# Patient Record
Sex: MALE | Race: WHITE | NOT HISPANIC OR LATINO | Employment: FULL TIME | URBAN - METROPOLITAN AREA
[De-identification: names, ages, dates, MRNs, and addresses within clinical notes are randomized per-mention and may not be internally consistent; named-entity substitution may affect disease eponyms.]

---

## 2017-03-06 ENCOUNTER — OFFICE VISIT (OUTPATIENT)
Dept: FAMILY MEDICINE | Facility: CLINIC | Age: 22
End: 2017-03-06
Payer: COMMERCIAL

## 2017-03-06 VITALS
WEIGHT: 188 LBS | HEIGHT: 70 IN | DIASTOLIC BLOOD PRESSURE: 70 MMHG | TEMPERATURE: 98.4 F | SYSTOLIC BLOOD PRESSURE: 100 MMHG | BODY MASS INDEX: 26.92 KG/M2 | HEART RATE: 64 BPM | OXYGEN SATURATION: 97 %

## 2017-03-06 DIAGNOSIS — H10.021 PINK EYE DISEASE OF RIGHT EYE: Primary | ICD-10-CM

## 2017-03-06 PROCEDURE — 99213 OFFICE O/P EST LOW 20 MIN: CPT | Performed by: FAMILY MEDICINE

## 2017-03-06 RX ORDER — MOMETASONE FUROATE MONOHYDRATE 50 UG/1
1-2 SPRAY, METERED NASAL DAILY PRN
COMMUNITY
Start: 2017-02-20 | End: 2017-06-19

## 2017-03-06 RX ORDER — GENTAMICIN SULFATE 3 MG/ML
1 SOLUTION/ DROPS OPHTHALMIC EVERY 4 HOURS
Qty: 5 ML | Refills: 0 | Status: SHIPPED | OUTPATIENT
Start: 2017-03-06 | End: 2017-03-13

## 2017-03-06 NOTE — MR AVS SNAPSHOT
After Visit Summary   3/6/2017    Reno Tran    MRN: [id number]           Patient Information     Date Of Birth          1995        Visit Information        Provider Department      3/6/2017 9:40 AM Kevan Marie MD [facility] [place]        Today's Diagnoses     Pink eye disease of right eye    -  1      Care Instructions      Conjunctivitis, Viral    Viral conjunctivitis (sometimes called pink eye) is a common infection of the eye. It is very contagious. Touching the infected eye, then touching another person passes this infection. It can also be spread from one eye to the other in this same way. The most common symptoms include redness, discharge from the eye, swollen eyelids, and a gritty or scratchy feeling in the eye.  This condition will take about 7 to 10 days to go away. Artificial tears (available without a prescription) are often recommended to moisten and clean the eyes. Antibiotic eye drops often are not recommended because they will not kill the virus. But sometimes they may be prescribed by eye doctors. This is to prevent a second, bacterial infection.  Home care    Apply a towel soaked in cool water to the affected eye 3 to 4 times a day (just before applying medicine to the eye).    It is common to have mucus drainage during the night, causing the eyelids to become crusted by morning. Use a warm, wet cloth to wipe this away.    Launder cloths used to clean the eye after one use. Do not reuse them.    If antibiotic medicines are prescribed, take them exactly as directed. Do not stop taking them until you are told to.    You may use acetaminophen or ibuprofen to control pain, unless another medicine was prescribed. (Note:If you have chronic liver or kidney disease, or if you have ever had a stomach ulcer or gastrointestinal bleeding, talk with your healthcare provider before using these medicines.) Aspirin should never be used in anyone under 18 years of age who is  ill with a fever. It may cause severe liver damage.    Wash your hands before and after touching the affected eye. This helps to prevent spreading the infection to your other eye and to others.    The infected person should avoid sharing towels, washcloths, and bedding with others. This is to prevent spreading the infection.    This illness is contagious during the first week. Children with this illness should be kept out of day care and school until the redness clears.  Follow-up care  Follow up with your healthcare provider, or as advised.  When to seek medical advice  Call your healthcare provider right away if any of these occur:    Worsening vision    Increasing pain in the eye    Increasing swelling or redness of the eyelid    Redness spreading to the face around the eye    Large amount of green or yellow drainage from the eye    Severe itching in or around the eye    Fever of 100.4 F (38 C) or higher    4149-7383 [employer]. [address] [place] [address]. All rights reserved. This information is not intended as a substitute for professional medical care. Always follow your healthcare professional's instructions.              Follow-ups after your visit        Follow-up notes from your care team     Return in about 1 week (around 3/13/2017), or if symptoms worsen or fail to improve.      Who to contact     If you have questions or need follow up information about today's clinic visit or your schedule please contact [facility] directly at [phone].  Normal or non-critical lab and imaging results will be communicated to you by MyChart, letter or phone within 4 business days after the clinic has received the results. If you do not hear from us within 7 days, please contact the clinic through If[employer]phone[employer] or phone. If you have a critical or abnormal lab result, we will notify you by phone as soon as possible.  Submit refill requests through [employer] or call your pharmacy  "and they will forward the refill request to us. Please allow 3 business days for your refill to be completed.          Additional Information About Your Visit        Edi[employer]iohart Information     [employer] gives you secure access to your electronic health record. If you see a primary care provider, you can also send messages to your care team and make appointments. If you have questions, please call your primary care clinic.  If you do not have a primary care provider, please call [phone] and they will assist you.        Care EveryWhere ID     This is your Care EveryWhere ID. This could be used by other organizations to access your [place] medical records  [id number]        Your Vitals Were     Pulse Temperature Height Pulse Oximetry BMI (Body Mass Index)       64 98.4  F (36.9  C) (Oral) 5' 10\" (1.778 m) 97% 26.98 kg/m2        Blood Pressure from Last 3 Encounters:   03/06/17 100/70   06/20/16 124/70   12/14/15 118/74    Weight from Last 3 Encounters:   03/06/17 188 lb (85.3 kg)   06/20/16 190 lb (86.2 kg)   12/14/15 190 lb (86.2 kg)              Today, you had the following     No orders found for display         Today's Medication Changes          These changes are accurate as of: 3/6/17 10:06 AM.  If you have any questions, ask your nurse or doctor.               Start taking these medicines.        Dose/Directions    gentamicin 0.3 % ophthalmic solution   Commonly known as:  GARAMYCIN   Used for:  Pink eye disease of right eye   Started by:  Kevan Marie MD        Dose:  1 drop   Place 1 drop into both eyes every 4 hours for 7 days   Quantity:  5 mL   Refills:  0         Stop taking these medicines if you haven't already. Please contact your care team if you have questions.     FLONASE 50 MCG/ACT spray   Generic drug:  fluticasone   Stopped by:  Kevan Marie MD                Where to get your medicines      Some of these will need a paper prescription and others can be bought over the counter.  Ask " your nurse if you have questions.     Bring a paper prescription for each of these medications     gentamicin 0.3 % ophthalmic solution                Primary Care Provider Office Phone # Fax #    Kevan Marie [phone]       [facility] [address] [place] [address]        Thank you!     Thank you for choosing [facility]  for your care. Our goal is always to provide you with excellent care. Hearing back from our patients is one way we can continue to improve our services. Please take a few minutes to complete the written survey that you may receive in the mail after your visit with us. Thank you!             Your Updated Medication List - Protect others around you: Learn how to safely use, store and throw away your medicines at www.disposemymeds.org.          This list is accurate as of: 3/6/17 10:06 AM.  Always use your most recent med list.                   Brand Name Dispense Instructions for use    gentamicin 0.3 % ophthalmic solution    GARAMYCIN    5 mL    Place 1 drop into both eyes every 4 hours for 7 days       mometasone 50 MCG/ACT spray    NASONEX     Spray 1-2 sprays in nostril daily as needed

## 2017-03-06 NOTE — PROGRESS NOTES
"  SUBJECTIVE:                                                    Reno Tran is a 21 year old male who presents to clinic today for the following health issues:    Eye(s) Problem     Onset: 1 day exposed to pink eye over the weekend    Description:   Location: right  Pain: no  Redness: YES    Accompanying Signs & Symptoms:  Discharge/mattering: YES  Swelling: YES  Visual changes: no  Fever: no  Nasal Congestion: no  Bothered by bright lights: no  Trauma: no     History:   Trauma: no   Foreign body exposure: no    Precipitating factors:   Wearing contacts: no    Alleviating factors:  Improved by: warm cloth- eye drops       Therapies Tried and outcome: eye drops      Problem list and histories reviewed & adjusted, as indicated.  Additional history: as documented    ROS:  Constitutional, HEENT, cardiovascular, pulmonary, GI, , musculoskeletal, neuro, skin, endocrine and psych systems are negative, except as otherwise noted.    This document serves as a record of the services and decisions personally performed and made by Kevan Marie MD. It was created on his behalf by Nella Sewell, a trained medical scribe. The creation of this document is based the provider's statements to the medical scribe.  Nella Sewell   OBJECTIVE:                                                    /70 (BP Location: Left arm, Patient Position: Chair, Cuff Size: Adult Large)  Pulse 64  Temp 98.4  F (36.9  C) (Oral)  Ht 1.778 m (5' 10\")  Wt 85.3 kg (188 lb)  SpO2 97%  BMI 26.98 kg/m2 Body mass index is 26.98 kg/(m^2).   GENERAL: healthy, alert, well nourished, well hydrated, no distress  EYES: mild injection of the conjunctiva - right eye, otherwise, Eyes grossly normal to inspection, extraocular movements - intact, and PERRL  HENT: ear canals- normal; TMs- normal; Nose- normal; Mouth- no ulcers, no lesions  NECK: no tenderness, no adenopathy, no asymmetry, no masses, no stiffness; thyroid- normal to palpation  PSYCH: Alert and " "oriented times 3; speech- coherent , normal rate and volume; able to articulate logical thoughts, able to abstract reason, no tangential thoughts, no hallucinations or delusions, affect- normal  Diagnostic test results: none     ASSESSMENT/PLAN:         Rneo was seen today for eye problem.    Diagnoses and all orders for this visit:    Pink eye disease of right eye: Advised to wash hands and avoid touching eyes.   -     gentamicin (GARAMYCIN) 0.3 % ophthalmic solution; Place 1 drop into both eyes every 4 hours for 7 days    Risks, benefits and alternatives of treatments discussed. Plan agreed on.      Followup: prm     Will call, return to clinic, or go to ED if worsening or symptoms not improving as discussed.    See patient instructions.     BMI:   Estimated body mass index is 26.98 kg/(m^2) as calculated from the following:    Height as of this encounter: 1.778 m (5' 10\").    Weight as of this encounter: 85.3 kg (188 lb).   Weight management plan: Discussed healthy diet and exercise guidelines and patient will follow up in 6 months in clinic to re-evaluate.      Health Maintenance Topics with due status: Overdue       Topic Date Due    HPV IMMUNIZATION 06/12/2004       Health maintenance reviewed/updated? Yes    The information in this document, created by the medical scribe for me, accurately reflects the services I personally performed and the decisions made by me. I have reviewed and approved this document for accuracy prior to leaving the patient care area.  Kevan Marie MD March 6, 2017 9:53 AM        Sy Marie MD   "

## 2017-03-06 NOTE — NURSING NOTE
"Chief Complaint   Patient presents with     Eye Problem       Initial /70  Pulse 64  Temp 98.4  F (36.9  C) (Oral)  Ht 5' 10\" (1.778 m)  Wt 188 lb (85.3 kg)  SpO2 97%  BMI 26.98 kg/m2 Estimated body mass index is 26.98 kg/(m^2) as calculated from the following:    Height as of this encounter: 5' 10\" (1.778 m).    Weight as of this encounter: 188 lb (85.3 kg).  Medication Reconciliation: complete  "

## 2017-03-06 NOTE — PATIENT INSTRUCTIONS
Conjunctivitis, Viral    Viral conjunctivitis (sometimes called pink eye) is a common infection of the eye. It is very contagious. Touching the infected eye, then touching another person passes this infection. It can also be spread from one eye to the other in this same way. The most common symptoms include redness, discharge from the eye, swollen eyelids, and a gritty or scratchy feeling in the eye.  This condition will take about 7 to 10 days to go away. Artificial tears (available without a prescription) are often recommended to moisten and clean the eyes. Antibiotic eye drops often are not recommended because they will not kill the virus. But sometimes they may be prescribed by eye doctors. This is to prevent a second, bacterial infection.  Home care    Apply a towel soaked in cool water to the affected eye 3 to 4 times a day (just before applying medicine to the eye).    It is common to have mucus drainage during the night, causing the eyelids to become crusted by morning. Use a warm, wet cloth to wipe this away.    Launder cloths used to clean the eye after one use. Do not reuse them.    If antibiotic medicines are prescribed, take them exactly as directed. Do not stop taking them until you are told to.    You may use acetaminophen or ibuprofen to control pain, unless another medicine was prescribed. (Note:If you have chronic liver or kidney disease, or if you have ever had a stomach ulcer or gastrointestinal bleeding, talk with your healthcare provider before using these medicines.) Aspirin should never be used in anyone under 18 years of age who is ill with a fever. It may cause severe liver damage.    Wash your hands before and after touching the affected eye. This helps to prevent spreading the infection to your other eye and to others.    The infected person should avoid sharing towels, washcloths, and bedding with others. This is to prevent spreading the infection.    This illness is contagious during  the first week. Children with this illness should be kept out of day care and school until the redness clears.  Follow-up care  Follow up with your healthcare provider, or as advised.  When to seek medical advice  Call your healthcare provider right away if any of these occur:    Worsening vision    Increasing pain in the eye    Increasing swelling or redness of the eyelid    Redness spreading to the face around the eye    Large amount of green or yellow drainage from the eye    Severe itching in or around the eye    Fever of 100.4 F (38 C) or higher    2099-0621 [employer]. [address] [place] [address]. All rights reserved. This information is not intended as a substitute for professional medical care. Always follow your healthcare professional's instructions.

## 2017-04-03 ENCOUNTER — RADIANT APPOINTMENT (OUTPATIENT)
Dept: GENERAL RADIOLOGY | Facility: CLINIC | Age: 22
End: 2017-04-03
Attending: FAMILY MEDICINE
Payer: COMMERCIAL

## 2017-04-03 ENCOUNTER — OFFICE VISIT (OUTPATIENT)
Dept: FAMILY MEDICINE | Facility: CLINIC | Age: 22
End: 2017-04-03
Payer: COMMERCIAL

## 2017-04-03 VITALS
OXYGEN SATURATION: 97 % | DIASTOLIC BLOOD PRESSURE: 60 MMHG | SYSTOLIC BLOOD PRESSURE: 114 MMHG | BODY MASS INDEX: 26.04 KG/M2 | WEIGHT: 186 LBS | HEIGHT: 71 IN | HEART RATE: 62 BPM | RESPIRATION RATE: 12 BRPM | TEMPERATURE: 98.1 F

## 2017-04-03 DIAGNOSIS — S09.92XA: ICD-10-CM

## 2017-04-03 DIAGNOSIS — S02.2XXA CLOSED FRACTURE OF NASAL BONE, INITIAL ENCOUNTER: Primary | ICD-10-CM

## 2017-04-03 PROCEDURE — 99213 OFFICE O/P EST LOW 20 MIN: CPT | Performed by: FAMILY MEDICINE

## 2017-04-03 PROCEDURE — 70160 X-RAY EXAM OF NASAL BONES: CPT

## 2017-04-03 NOTE — MR AVS SNAPSHOT
After Visit Summary   4/3/2017    Reno Tran    MRN: [id number]           Patient Information     Date Of Birth          1995        Visit Information        Provider Department      4/3/2017 2:40 PM Kevan Marie MD [facility]        Today's Diagnoses     Closed fracture of nasal bone, initial encounter    -  1    Injury to nose           Follow-ups after your visit        Additional Services     OTOLARYNGOLOGY REFERRAL       Your provider has referred you to: [facility]: [facility] [phone]   [email or web address]    Please be aware that coverage of these services is subject to the terms and limitations of your health insurance plan.  Call member services at your health plan with any benefit or coverage questions.      Please bring the following with you to your appointment:    (1) Any X-Rays, CTs or MRIs which have been performed.  Contact the facility where they were done to arrange for  prior to your scheduled appointment.   (2) List of current medications  (3) This referral request   (4) Any documents/labs given to you for this referral                  Who to contact     If you have questions or need follow up information about today's clinic visit or your schedule please contact [facility] directly at [phone].  Normal or non-critical lab and imaging results will be communicated to you by MyChart, letter or phone within 4 business days after the clinic has received the results. If you do not hear from us within 7 days, please contact the clinic through MyChart or phone. If you have a critical or abnormal lab result, we will notify you by phone as soon as possible.  Submit refill requests through [employer] or call your pharmacy and they will forward the refill request to us. Please allow 3 business days for your refill to be completed.          Additional Information About Your Visit        MyChart  "Information     [employer] gives you secure access to your electronic health record. If you see a primary care provider, you can also send messages to your care team and make appointments. If you have questions, please call your primary care clinic.  If you do not have a primary care provider, please call [phone] and they will assist you.        Care EveryWhere ID     This is your Care EveryWhere ID. This could be used by other organizations to access your [place] medical records  [id number]        Your Vitals Were     Pulse Temperature Respirations Height Pulse Oximetry BMI (Body Mass Index)    62 98.1  F (36.7  C) (Tympanic) 12 5' 11\" (1.803 m) 97% 25.94 kg/m2       Blood Pressure from Last 3 Encounters:   04/03/17 114/60   03/06/17 100/70   06/20/16 124/70    Weight from Last 3 Encounters:   04/03/17 186 lb (84.4 kg)   03/06/17 188 lb (85.3 kg)   06/20/16 190 lb (86.2 kg)              We Performed the Following     OTOLARYNGOLOGY REFERRAL        Primary Care Provider Office Phone # Fax #    Kevan Marie [phone]       [address] [address]        Thank you!     Thank you for choosing [facility]  for your care. Our goal is always to provide you with excellent care. Hearing back from our patients is one way we can continue to improve our services. Please take a few minutes to complete the written survey that you may receive in the mail after your visit with us. Thank you!             Your Updated Medication List - Protect others around you: Learn how to safely use, store and throw away your medicines at www.disposemymeds.org.          This list is accurate as of: 4/3/17  3:36 PM.  Always use your most recent med list.                   Brand Name Dispense Instructions for use    mometasone 50 MCG/ACT spray    NASONEX     Spray 1-2 sprays in nostril daily as needed         "

## 2017-04-03 NOTE — PROGRESS NOTES
"  SUBJECTIVE:                                                    Reno Tran is a 21 year old male who presents to clinic today for the following health issues:    Nose injury today playing basketball 4 hours ago.  Now complains of headache and feels the nose is swelling  Vision: normal  Tooth pain: none    Problem list and histories reviewed & adjusted, as indicated.  Additional history: as documented    ROS:  Constitutional, HEENT, cardiovascular, pulmonary, GI, , musculoskeletal, neuro, skin, endocrine and psych systems are negative, except as otherwise noted.    This document serves as a record of the services and decisions personally performed and made by Kevan Marie MD. It was created on his behalf by Nella Sewell, a trained medical scribe. The creation of this document is based the provider's statements to the medical scribe.  Nella Sewell   OBJECTIVE:                                                    /60  Pulse 62  Temp 98.1  F (36.7  C) (Tympanic)  Resp 12  Ht 1.803 m (5' 11\")  Wt 84.4 kg (186 lb)  SpO2 97%  BMI 25.94 kg/m2 Body mass index is 25.94 kg/(m^2).   GENERAL: healthy, alert, well nourished, well hydrated, no distress  EYES: Eyes grossly normal to inspection, extraocular movements - intact  MS: edema of nose, blood in nose, slight deviation of septum to the right, otherwise, extremities- no gross deformities noted, no edema  SKIN: no suspicious lesions, no rashes  PSYCH: Alert and oriented times 3; speech- coherent , normal rate and volume; able to articulate logical thoughts, able to abstract reason, no tangential thoughts, no hallucinations or delusions, affect- normal  Diagnostic test results:  Nose Xray - minimally displaced fracture of the nasal bone     ASSESSMENT/PLAN:         Reno was seen today for facial injury.    Diagnoses and all orders for this visit:    Closed fracture of nasal bone, initial encounter  Injury to nose: Advised to sleep propped " up/ice/acetominophen/drink fluids to help alleviate symptoms.   -     XR Nasal Bones 3 Views; Future  -     OTOLARYNGOLOGY REFERRAL    Risks, benefits and alternatives of treatments discussed. Plan agreed on.      Followup: Follow up with otolaryngology    Will call, return to clinic, or go to ED if worsening or symptoms not improving as discussed.    See patient instructions.       Health Maintenance Topics with due status: Overdue       Topic Date Due    HPV IMMUNIZATION 06/12/2004       Health maintenance reviewed/updated? Yes    The information in this document, created by the medical scribe for me, accurately reflects the services I personally performed and the decisions made by me. I have reviewed and approved this document for accuracy prior to leaving the patient care area.  Kevan Marie MD April 3, 2017 3:25 PM        Sy Marie MD

## 2017-04-03 NOTE — LETTER
[facility]  [address] [place] [address]                  [phone]   April 5, 2017    Reno Tran  [address] [address]      Dear Reno,    Here is a summary of your recent test results:    I believe there is a nondisplaced nasal bone fracture - referred to ENT    Your test results are enclosed.      Please contact me if you have any questions.    In addition, here is a list of due or overdue Health Maintenance reminders.    Health Maintenance Due   Topic Date Due     HPV IMMUNIZATION (1 of 3 - Male 3 Dose Series) 06/12/2004       Please call us at [phone] (or use [employer]) to address the above recommendations.            Thank you very much for trusting [facility]..     Healthy regards,          Sy Marie M.D.        Results for orders placed or performed in visit on 04/03/17   XR Nasal Bones 3 Views    Narrative    NASAL BONES THREE VIEWS  4/3/2017  3:16 PM     HISTORY: Unspecified injury of nose, initial encounter.       Impression    IMPRESSION: Three views of the nasal bone are performed. Oblique views  show no evidence of fracture. Mackay' view demonstrates a midline  septum. No air-fluid levels are noted in the maxillary sinuses.  Paranasal sinuses and mastoid air cells appear clear where visualized.    VALENTÍN ORELLANA MD

## 2017-04-05 ENCOUNTER — TRANSFERRED RECORDS (OUTPATIENT)
Dept: HEALTH INFORMATION MANAGEMENT | Facility: CLINIC | Age: 22
End: 2017-04-05

## 2017-06-19 ENCOUNTER — OFFICE VISIT (OUTPATIENT)
Dept: FAMILY MEDICINE | Facility: CLINIC | Age: 22
End: 2017-06-19
Payer: COMMERCIAL

## 2017-06-19 ENCOUNTER — RADIANT APPOINTMENT (OUTPATIENT)
Dept: GENERAL RADIOLOGY | Facility: CLINIC | Age: 22
End: 2017-06-19
Attending: FAMILY MEDICINE
Payer: COMMERCIAL

## 2017-06-19 VITALS
DIASTOLIC BLOOD PRESSURE: 68 MMHG | TEMPERATURE: 98.5 F | BODY MASS INDEX: 26.04 KG/M2 | HEIGHT: 71 IN | WEIGHT: 186 LBS | SYSTOLIC BLOOD PRESSURE: 120 MMHG | OXYGEN SATURATION: 98 % | HEART RATE: 78 BPM

## 2017-06-19 DIAGNOSIS — M79.672 LEFT FOOT PAIN: ICD-10-CM

## 2017-06-19 DIAGNOSIS — R09.81 CHRONIC NASAL CONGESTION: ICD-10-CM

## 2017-06-19 DIAGNOSIS — S99.929A FOOT INJURY: Primary | ICD-10-CM

## 2017-06-19 DIAGNOSIS — S99.929A FOOT INJURY: ICD-10-CM

## 2017-06-19 DIAGNOSIS — J30.1 SEASONAL ALLERGIC RHINITIS DUE TO POLLEN: Primary | ICD-10-CM

## 2017-06-19 PROCEDURE — 99213 OFFICE O/P EST LOW 20 MIN: CPT | Performed by: FAMILY MEDICINE

## 2017-06-19 PROCEDURE — 73630 X-RAY EXAM OF FOOT: CPT | Mod: LT

## 2017-06-19 RX ORDER — MOMETASONE FUROATE MONOHYDRATE 50 UG/1
1-2 SPRAY, METERED NASAL DAILY PRN
Qty: 17 G | Refills: 11 | Status: SHIPPED | OUTPATIENT
Start: 2017-06-19

## 2017-06-19 RX ORDER — NAPROXEN SODIUM 220 MG
220 TABLET ORAL 2 TIMES DAILY WITH MEALS
Qty: 180 TABLET | Refills: 3 | COMMUNITY
Start: 2017-06-19 | End: 2017-06-24

## 2017-06-19 NOTE — MR AVS SNAPSHOT
"              After Visit Summary   6/19/2017    Reno Tran    MRN: [id number]           Patient Information     Date Of Birth          1995        Visit Information        Provider Department      6/19/2017 1:40 PM Kevan Marie MD [facility]        Today's Diagnoses     Seasonal allergic rhinitis due to pollen    -  1    Left foot pain        Chronic nasal congestion           Follow-ups after your visit        Who to contact     If you have questions or need follow up information about today's clinic visit or your schedule please contact [facility] directly at [phone].  Normal or non-critical lab and imaging results will be communicated to you by [employer], letter or phone within 4 business days after the clinic has received the results. If you do not hear from us within 7 days, please contact the clinic through [employer] or phone. If you have a critical or abnormal lab result, we will notify you by phone as soon as possible.  Submit refill requests through [employer] or call your pharmacy and they will forward the refill request to us. Please allow 3 business days for your refill to be completed.          Additional Information About Your Visit        MyChart Information     [employer] gives you secure access to your electronic health record. If you see a primary care provider, you can also send messages to your care team and make appointments. If you have questions, please call your primary care clinic.  If you do not have a primary care provider, please call [phone] and they will assist you.        Care EveryWhere ID     This is your Care EveryWhere ID. This could be used by other organizations to access your [place] medical records  [id number]        Your Vitals Were     Pulse Temperature Height Pulse Oximetry BMI (Body Mass Index)       78 98.5  F (36.9  C) (Oral) 5' 11\" (1.803 m) 98% 25.94 kg/m2        Blood Pressure from Last 3 Encounters:   06/19/17 120/68 "   04/03/17 114/60   03/06/17 100/70    Weight from Last 3 Encounters:   06/19/17 186 lb (84.4 kg)   04/03/17 186 lb (84.4 kg)   03/06/17 188 lb (85.3 kg)              Today, you had the following     No orders found for display         Today's Medication Changes          These changes are accurate as of: 6/19/17  2:24 PM.  If you have any questions, ask your nurse or doctor.               Start taking these medicines.        Dose/Directions    naproxen sodium 220 MG tablet   Commonly known as:  ALEVE   Used for:  Left foot pain   Started by:  Kevan Marie MD        Dose:  220 mg   Take 1 tablet (220 mg) by mouth 2 times daily (with meals) for 5 days   Quantity:  180 tablet   Refills:  3            Where to get your medicines      These medications were sent to [facility] [place] [address]     Phone:  [phone]     mometasone 50 MCG/ACT spray         Some of these will need a paper prescription and others can be bought over the counter.  Ask your nurse if you have questions.     You don't need a prescription for these medications     naproxen sodium 220 MG tablet                Primary Care Provider Office Phone # Fax #    Kevan Marie [phone]       [address] [address]        Thank you!     Thank you for choosing [facility]  for your care. Our goal is always to provide you with excellent care. Hearing back from our patients is one way we can continue to improve our services. Please take a few minutes to complete the written survey that you may receive in the mail after your visit with us. Thank you!             Your Updated Medication List - Protect others around you: Learn how to safely use, store and throw away your medicines at www.disposemymeds.org.          This list is accurate as of: 6/19/17  2:24 PM.  Always use your  most recent med list.                   Brand Name Dispense Instructions for use    mometasone 50 MCG/ACT spray    NASONEX    17 g    Spray 1-2 sprays in nostril daily as needed       naproxen sodium 220 MG tablet    ALEVE    180 tablet    Take 1 tablet (220 mg) by mouth 2 times daily (with meals) for 5 days

## 2017-06-19 NOTE — PROGRESS NOTES
"  SUBJECTIVE:                                                    Reno Tran is a 22 year old male who presents to clinic today for the following health issues:    Foot Pain     Onset: noticed 5 days    Description:   Location: lateral side of left foot  Character: Sharp and Dull ache    Intensity: moderate, severe    Progression of Symptoms: worse when on foot long    Accompanying Signs & Symptoms:  Other symptoms: none  Erythema/swelling: Yes, a little initially  Knee pain: Yes, left medial knee was sore when playing frisbee  Heel pain: no   History:   Previous similar pain: no     Bad ankle sprains: no    Precipitating factors:   Trauma or overuse: YES- pt is a runner and his running shoes are bad- patient is very active  Hurt while he was playing golf   Has high arches    Alleviating factors:  Improved by: ice and rest       Therapies Tried and outcome: nothing    Allergies: Symptoms are tolerable. He does not take an antihistamine, only takes the mometasone daily.    Problem list and histories reviewed & adjusted, as indicated.  Additional history: as documented    ROS:  Constitutional, HEENT, cardiovascular, pulmonary, GI, , musculoskeletal, neuro, skin, endocrine and psych systems are negative, except as otherwise noted.    This document serves as a record of the services and decisions personally performed and made by Kevan Marie MD. It was created on his behalf by Nella Sewell, a trained medical scribe. The creation of this document is based the provider's statements to the medical scribe.  Nella Sewell   OBJECTIVE:                                                    /68 (BP Location: Right arm, Patient Position: Chair, Cuff Size: Adult Large)  Pulse 78  Temp 98.5  F (36.9  C) (Oral)  Ht 1.803 m (5' 11\")  Wt 84.4 kg (186 lb)  SpO2 98%  BMI 25.94 kg/m2 Body mass index is 25.94 kg/(m^2).   GENERAL: healthy, alert, well nourished, well hydrated, no distress  EYES: Eyes grossly normal to " "inspection, extraocular movements - intact  MS: Mild tenderness at the proximal 5th metatarsal of left foot, mild tenderness at the insertion of the peroneus brevis of left foot, no swelling, otherwise, extremities- no gross deformities noted, no edema  SKIN: no suspicious lesions, no rashes  PSYCH: Alert and oriented times 3; speech- coherent , normal rate and volume; able to articulate logical thoughts, able to abstract reason, no tangential thoughts, no hallucinations or delusions, affect- normal  Diagnostic test results:  Left foot Xray - no fractures     ASSESSMENT/PLAN:         Reno was seen today for musculoskeletal problem.    Diagnoses and all orders for this visit:    Seasonal allergic rhinitis due to pollen: - controlled - continue medication.  -     mometasone (NASONEX) 50 MCG/ACT spray; Spray 1-2 sprays in nostril daily as needed    Left foot pain: Discussed buying new running shoes, rest, consider shoe inserts, ice and take other symptomatic cares.   -     naproxen sodium (ALEVE) 220 MG tablet; Take 1 tablet (220 mg) by mouth 2 times daily (with meals) for 5 days    Chronic nasal congestion: - controlled - continue medication.  -     mometasone (NASONEX) 50 MCG/ACT spray; Spray 1-2 sprays in nostril daily as needed    Risks, benefits and alternatives of treatments discussed. Plan agreed on.      Followup: prn    Will call, return to clinic, or go to ED if worsening or symptoms not improving as discussed.    See patient instructions.     BMI:   Estimated body mass index is 25.94 kg/(m^2) as calculated from the following:    Height as of this encounter: 1.803 m (5' 11\").    Weight as of this encounter: 84.4 kg (186 lb).   Weight management plan: Discussed healthy diet and exercise guidelines and patient will follow up in 6 months in clinic to re-evaluate.      The patient has no Health Maintenance topics of status Overdue, Due On, or Due Soon    Health maintenance reviewed/updated? Yes    The information " in this document, created by the medical scribe for me, accurately reflects the services I personally performed and the decisions made by me. I have reviewed and approved this document for accuracy prior to leaving the patient care area.  Kevan Marie MD June 19, 2017 1:19 PM        Sy Marie MD

## 2017-06-19 NOTE — NURSING NOTE
"Chief Complaint   Patient presents with     Musculoskeletal Problem       Initial /68 (BP Location: Right arm, Patient Position: Chair, Cuff Size: Adult Large)  Pulse 78  Temp 98.5  F (36.9  C) (Oral)  Ht 5' 11\" (1.803 m)  Wt 186 lb (84.4 kg)  SpO2 98%  BMI 25.94 kg/m2 Estimated body mass index is 25.94 kg/(m^2) as calculated from the following:    Height as of this encounter: 5' 11\" (1.803 m).    Weight as of this encounter: 186 lb (84.4 kg).  Medication Reconciliation: complete  "

## 2019-05-29 ENCOUNTER — OFFICE VISIT (OUTPATIENT)
Dept: FAMILY MEDICINE | Facility: CLINIC | Age: 24
End: 2019-05-29
Payer: COMMERCIAL

## 2019-05-29 VITALS
DIASTOLIC BLOOD PRESSURE: 70 MMHG | TEMPERATURE: 98.2 F | OXYGEN SATURATION: 96 % | SYSTOLIC BLOOD PRESSURE: 112 MMHG | WEIGHT: 190 LBS | HEIGHT: 71 IN | HEART RATE: 66 BPM | BODY MASS INDEX: 26.6 KG/M2

## 2019-05-29 DIAGNOSIS — B07.8 COMMON WART: ICD-10-CM

## 2019-05-29 DIAGNOSIS — Z00.00 ROUTINE GENERAL MEDICAL EXAMINATION AT A HEALTH CARE FACILITY: Primary | ICD-10-CM

## 2019-05-29 PROCEDURE — 17110 DESTRUCTION B9 LES UP TO 14: CPT | Performed by: PHYSICIAN ASSISTANT

## 2019-05-29 PROCEDURE — 99395 PREV VISIT EST AGE 18-39: CPT | Mod: 25 | Performed by: PHYSICIAN ASSISTANT

## 2019-05-29 ASSESSMENT — MIFFLIN-ST. JEOR: SCORE: 1868.96

## 2019-05-29 NOTE — PROGRESS NOTES
SUBJECTIVE:   CC: Reno Tran is an 23 year old male who presents for preventive health visit.     Healthy Habits:    Do you get at least three servings of calcium containing foods daily (dairy, green leafy vegetables, etc.)? yes    Amount of exercise or daily activities, outside of work: 5 day(s) per week - training for marathon    Problems taking medications regularly No    Medication side effects: No    Have you had an eye exam in the past two years? no    Do you see a dentist twice per year? yes    Do you have sleep apnea, excessive snoring or daytime drowsiness?no      WART(S)  Onset: 2 months - wart on right middle finger, wart on second toe left foot.      Description:   Location: Right finger middle finger, L toe 2nd   Number of warts: 2  Painful: no    Accompanying Signs & Symptoms:  Signs of infection: no    History:   History of trauma: no  Prior warts: no    Therapies Tried and outcome: none    Today's PHQ-2 Score: 0-0  PHQ-2 ( 1999 [employer]) 5/29/2019 11/28/2014   Q1: Little interest or pleasure in doing things 0 0   Q2: Feeling down, depressed or hopeless 0 0   PHQ-2 Score 0 0       Abuse: Current or Past(Physical, Sexual or Emotional)- No  Do you feel safe in your environment? Yes    Social History     Tobacco Use     Smoking status: Never Smoker     Smokeless tobacco: Never Used   Substance Use Topics     Alcohol use: Yes     Comment: 2-4 beers per week     If you drink alcohol do you typically have >3 drinks per day or >7 drinks per week? No                      Last PSA: No results found for: PSA    Reviewed orders with patient. Reviewed health maintenance and updated orders accordingly - Yes  Labs reviewed in EPIC    Reviewed and updated as needed this visit by clinical staff  Tobacco  Allergies  Meds  Med Hx  Surg Hx  Fam Hx  Soc Hx        Reviewed and updated as needed this visit by Provider            ROS:  CONSTITUTIONAL: NEGATIVE for fever, chills, change in weight  INTEGUMENTARY/SKIN:  "NEGATIVE for worrisome rashes, moles or lesions  EYES: NEGATIVE for vision changes or irritation  ENT: NEGATIVE for ear, mouth and throat problems  RESP: NEGATIVE for significant cough or SOB  CV: NEGATIVE for chest pain, palpitations or peripheral edema  GI: NEGATIVE for nausea, abdominal pain, heartburn, or change in bowel habits   male: negative for dysuria, hematuria, decreased urinary stream, erectile dysfunction, urethral discharge  MUSCULOSKELETAL: NEGATIVE for significant arthralgias or myalgia  NEURO: NEGATIVE for weakness, dizziness or paresthesias  PSYCHIATRIC: NEGATIVE for changes in mood or affect    OBJECTIVE:   /70 (BP Location: Left arm, Patient Position: Chair, Cuff Size: Adult Large)   Pulse 66   Temp 98.2  F (36.8  C) (Oral)   Ht 1.803 m (5' 11\")   Wt 86.2 kg (190 lb)   SpO2 96%   BMI 26.50 kg/m    EXAM:  GENERAL: healthy, alert and no distress  EYES: Eyes grossly normal to inspection, PERRL and conjunctivae and sclerae normal  HENT: ear canals and TM's normal, nose and mouth without ulcers or lesions  NECK: no adenopathy, no asymmetry, masses, or scars and thyroid normal to palpation  RESP: lungs clear to auscultation - no rales, rhonchi or wheezes  CV: regular rate and rhythm, normal S1 S2, no S3 or S4, no murmur, click or rub, no peripheral edema and peripheral pulses strong  ABDOMEN: soft, nontender, no hepatosplenomegaly, no masses and bowel sounds normal  MS: no gross musculoskeletal defects noted, no edema  SKIN: no suspicious lesions or rashes, wart noted on right middle finger and left 2nd toe  NEURO: Normal strength and tone, mentation intact and speech normal  PSYCH: mentation appears normal, affect normal/bright    Diagnostic Test Results:  Labs reviewed in Epic    ASSESSMENT/PLAN:   1. Routine general medical examination at a health care facility      2. Common wart    - DESTRUCT BENIGN LESION, UP TO 14  - Freeze, thaw cycle x3 done on each wart today.  Patient tolerated " "procedure well.    - Patient advised to followup in about 2 weeks for re-treatment if warts are not resolved.      COUNSELING:  Reviewed preventive health counseling, as reflected in patient instructions    Estimated body mass index is 26.5 kg/m  as calculated from the following:    Height as of this encounter: 1.803 m (5' 11\").    Weight as of this encounter: 86.2 kg (190 lb).    Weight management plan: Discussed healthy diet and exercise guidelines     reports that he has never smoked. He has never used smokeless tobacco.      Counseling Resources:  ATP IV Guidelines  Pooled Cohorts Equation Calculator  FRAX Risk Assessment  ICSI Preventive Guidelines  Dietary Guidelines for Americans, 2010  USDA's MyPlate  ASA Prophylaxis  Lung CA Screening    Astrid Parisi PA-C  [facility] [place]  "

## 2019-08-20 ENCOUNTER — OFFICE VISIT (OUTPATIENT)
Dept: FAMILY MEDICINE | Facility: CLINIC | Age: 24
End: 2019-08-20
Payer: COMMERCIAL

## 2019-08-20 VITALS
TEMPERATURE: 98.5 F | OXYGEN SATURATION: 96 % | WEIGHT: 196 LBS | HEART RATE: 40 BPM | DIASTOLIC BLOOD PRESSURE: 73 MMHG | HEIGHT: 71 IN | BODY MASS INDEX: 27.44 KG/M2 | SYSTOLIC BLOOD PRESSURE: 114 MMHG

## 2019-08-20 DIAGNOSIS — B07.9 VIRAL WARTS, UNSPECIFIED TYPE: Primary | ICD-10-CM

## 2019-08-20 PROCEDURE — 17110 DESTRUCTION B9 LES UP TO 14: CPT | Performed by: FAMILY MEDICINE

## 2019-08-20 ASSESSMENT — MIFFLIN-ST. JEOR: SCORE: 1891.18

## 2019-08-20 NOTE — PROGRESS NOTES
"  Reno Tran is a 24 year old male who presents to clinic today for the following health issues:    HPI   WART(S)      Onset: one of foot (1.5year) second one of finger (6months ago)    Description (location/number): 2     Accompanying signs and symptoms: Painful: no     History: prior warts: no     Therapies tried and outcome: Frozen off once    Patient has not tried over-the counter anti-wart medications.  There is no history of infection or injury.  This is the patient's second treatment.  Discussed various treatments and the patient elects for cryotherapy    O: The patient appears today in no apparent distress.  /73   Pulse (!) 40   Temp 98.5  F (36.9  C) (Oral)   Ht 1.803 m (5' 11\")   Wt 88.9 kg (196 lb)   SpO2 96%   BMI 27.34 kg/m    Skin: non-erythematous, raised papule(s) with pinpoint hemmorhages on finger and 2nd toe.  Total number being treated today: 2    A: Common Wart(s).    P:  Each wart was frozen easily three times with liquid nitrogen. The etiology of common warts were discussed.  I recommend the  use the over-the-counter medications such as Compound W under occlusion after healing if there are any small lesions left. Return in about 3 weeks if retreatment seems necessary.    "

## 2019-08-21 ENCOUNTER — TELEPHONE (OUTPATIENT)
Dept: FAMILY MEDICINE | Facility: CLINIC | Age: 24
End: 2019-08-21

## 2019-08-21 NOTE — TELEPHONE ENCOUNTER
Reason for call:  Patient reporting a symptom    Symptom or request: Pt has very large and sore blood blisters after warts were frozen off. Pt unable to walk or place pressure on his feet    Duration (how long have symptoms been present): after OV on 8.20.19    Have you been treated for this before? Yes    Additional comments: Pt is wondering if this is normal and how to relieve symptoms    Phone Number patient can be reached at:  Cell number on file:    Telephone Information:   Mobile [phone]     Best Time:  any    Can we leave a detailed message on this number:  YES    Call taken on 8/21/2019 at 1:02 PM by Ruby Estrada

## 2019-08-21 NOTE — TELEPHONE ENCOUNTER
We don't recommend doing anything with the blood blisters - it they are popped it can increase risk of infection.  This can be a very normal reaction to freezing them.  PN

## 2019-08-21 NOTE — TELEPHONE ENCOUNTER
PN,   Please advise in JF's absence    Pt seen yesterday for wart treatments  One to finger and one to second toe    Patient states both areas turned white yesterday which he expected  Areas now are very dark purple though  Seems like a blood blister  One on his toe is so big that when he steps on foot area is compressed and causes a lot of pain  Pt states he is unable to walk on it d/t the pain  Feels like there is fluid underneath  No drainage at the moment    Rest of foot and leg and then hand are not red or warm   Patient asked if he should pop areas   Please advise  Thanks,  Hannah HERNANDES RN

## 2019-08-22 ENCOUNTER — TELEPHONE (OUTPATIENT)
Dept: FAMILY MEDICINE | Facility: CLINIC | Age: 24
End: 2019-08-22

## 2019-08-22 ENCOUNTER — OFFICE VISIT (OUTPATIENT)
Dept: FAMILY MEDICINE | Facility: CLINIC | Age: 24
End: 2019-08-22
Payer: COMMERCIAL

## 2019-08-22 VITALS
SYSTOLIC BLOOD PRESSURE: 131 MMHG | WEIGHT: 194.6 LBS | OXYGEN SATURATION: 99 % | HEART RATE: 51 BPM | TEMPERATURE: 98.1 F | DIASTOLIC BLOOD PRESSURE: 76 MMHG | HEIGHT: 71 IN | BODY MASS INDEX: 27.24 KG/M2

## 2019-08-22 DIAGNOSIS — T14.8XXA BLOOD BLISTER: Primary | ICD-10-CM

## 2019-08-22 PROCEDURE — 99207 ZZC NO BILLABLE SERVICE THIS VISIT: CPT | Performed by: FAMILY MEDICINE

## 2019-08-22 ASSESSMENT — MIFFLIN-ST. JEOR: SCORE: 1884.83

## 2019-08-22 NOTE — PROGRESS NOTES
Subjective     Reno Tran is a 24 year old male who presents to clinic today for the following health issues:    HPI   Blisters      Duration: x2 days    Description (location/character/radiation): LT second toe and RT middle finger     Intensity:  severe    Accompanying signs and symptoms: purple/black blister that is growing in size after getting wart frozen off 2 days ago     History (similar episodes/previous evaluation): None    Precipitating or alleviating factors: None    Therapies tried and outcome: None     Follow-up of wart treatment on 8/20    After treatment he had a blood blister that has been painful on his finger and on his toe    Exam shows a 16 mm blood blister on the toe and a 10 mm blood blister on the finger  Both of these were incised with an 11 blade and the clotted contents expressed    Patient felt relief after this    These were bandaged and he is instructed to keep them clean and dry    Follow up: Return as needed if symptoms fail to improve

## 2019-08-22 NOTE — TELEPHONE ENCOUNTER
Reason for Call:  Other call back    Detailed comments: [place] wart [place] and he is in a lot of pain and would like to chat with someone about the pain  Pain is  waking him up at night and can not walk on it and the location is swelling more.     Phone Number Patient can be reached at: Cell number on file:    Telephone Information:   Mobile [phone]       Best Time: any    Can we leave a detailed message on this number? Yes    Call taken on 8/22/2019 at 7:57 AM by Daysi Gomes

## 2019-08-22 NOTE — TELEPHONE ENCOUNTER
Patient calling back saying the pain is unbearable and would like a call  Back to discuss this call him back at # [phone] ok to leave a detailed message    Thank you

## 2019-08-22 NOTE — TELEPHONE ENCOUNTER
Spoke with patient.    Blood blister on toe is bigger, throbbing pain.  One on finger is harder, scab like    [place]uddlle with JF  JF can pop the blister or patient can.    Spoke with patient.  States he prefers to come in and have JF do.    Scheduled patient to see JF today at 1:30  Juana Roberts RN

## 2019-08-22 NOTE — TELEPHONE ENCOUNTER
Pt advised this is normal reaction to freezing. Advised Pt of blister opening and causing risk for infection. Pt should start to feel better post 48hr. If swelling or pain increases return call or proceed to ER/urgent care.   Pt advised of OTC pain relief.     Patient stated an understanding and agreed with plan.    Shahram Gordon RN   [place] [facility]

## 2019-08-22 NOTE — NURSING NOTE
"Chief Complaint   Patient presents with     Blister     LT second toe and RT middle finger      /76   Pulse 51   Temp 98.1  F (36.7  C) (Oral)   Ht 1.803 m (5' 11\")   Wt 88.3 kg (194 lb 9.6 oz)   SpO2 99%   BMI 27.14 kg/m   Estimated body mass index is 27.14 kg/m  as calculated from the following:    Height as of this encounter: 1.803 m (5' 11\").    Weight as of this encounter: 88.3 kg (194 lb 9.6 oz).  Medication Reconciliation: complete      Health Maintenance that is potentially due pending provider review:  NONE    n/a    ISIDRO Gill  "

## 2019-12-10 ENCOUNTER — OFFICE VISIT (OUTPATIENT)
Dept: FAMILY MEDICINE | Facility: CLINIC | Age: 24
End: 2019-12-10
Payer: COMMERCIAL

## 2019-12-10 VITALS
TEMPERATURE: 98.4 F | BODY MASS INDEX: 27.16 KG/M2 | HEART RATE: 82 BPM | SYSTOLIC BLOOD PRESSURE: 120 MMHG | OXYGEN SATURATION: 96 % | HEIGHT: 71 IN | WEIGHT: 194 LBS | DIASTOLIC BLOOD PRESSURE: 80 MMHG

## 2019-12-10 DIAGNOSIS — B02.9 HERPES ZOSTER WITHOUT COMPLICATION: Primary | ICD-10-CM

## 2019-12-10 PROCEDURE — 99213 OFFICE O/P EST LOW 20 MIN: CPT | Performed by: FAMILY MEDICINE

## 2019-12-10 RX ORDER — VALACYCLOVIR HYDROCHLORIDE 1 G/1
1000 TABLET, FILM COATED ORAL 3 TIMES DAILY
Qty: 21 TABLET | Refills: 0 | Status: SHIPPED | OUTPATIENT
Start: 2019-12-10 | End: 2020-10-30

## 2019-12-10 ASSESSMENT — MIFFLIN-ST. JEOR: SCORE: 1882.11

## 2019-12-10 NOTE — PATIENT INSTRUCTIONS
Patient Education     Shingles  Shingles is a viral infection caused by the same virus that causes chicken pox. Anyone who has had chicken pox may get shingles later in life. The virus stays in the body, but remains asleep (dormant). Shingles often occurs in older persons or persons with lowered immunity. But it can affect anyone at any age.  Shingles starts as a tingling patch of skin on one side of the body. Small, painful blisters may then appear. The rash rarely spreads to other parts of the body.  Exposure to shingles can't cause shingles. However, it can cause chicken pox in anyone who has not had chicken pox or has not been vaccinated. The contagious period ends when all blisters have crusted over, generally 1 to 2 weeks after the illness starts.  After the blisters heal, the affected skin may be sensitive or painful for weeks or months, gradually resolving over time. But, sometimes this can last longer and be permanent (called postherpetic neuralgia.)  Shingles vaccines are available. Vaccination can help prevent shingles or make it less painful. It is generally recommended for adults older than 50, even if you've had singles in the past. Talk with your healthcare provider about when to get vaccinated and which vaccine is best for you.  Home care    Medicines may be prescribed to help relieve pain. Take these medicines as directed. Ask your healthcare provider or pharmacist before using over-the-counter medicines for helping treat pain and itching.    In certain cases, antiviral medicines may be prescribed to reduce pain, shorten the illness, and prevent neuralgia. Take these medicines as directed.    Compresses made from a solution of cool water mixed with cornstarch or baking soda may help relieve pain and itching.     Gently wash skin daily with soap and water to help prevent infection. Be certain to rinse off all of the soap, which can be irritating.    Trim fingernails and try not to scratch.  Scratching the sores may leave scars.    Stay home from work or school until all blisters have formed a crust and you are no longer contagious.  Follow-up care  Follow up with your healthcare provider, or as directed.  When to seek medical advice    Fever of 100.4 F (38 C) or higher, or as directed by your healthcare provider    Affected skin is on the face or neck and any of the following occur:  ? Headache  ? Eye pain  ? Changes in vision  ? Sores near the eye  ? Weakness of facial muscles    Blisters occurring on new areas of the body    Pain, redness, or swelling of a joint    Signs of skin infection: colored drainage from the sores, warmth, increasing redness, fever, or increasing pain  Date Last Reviewed: 4/1/2018 2000-2018 [employer]. [address] [place] [address], [place] [address]. All rights reserved. This information is not intended as a substitute for professional medical care. Always follow your healthcare professional's instructions.

## 2019-12-10 NOTE — PROGRESS NOTES
"Subjective   Reno Tran is a 24 year old male who presents to clinic today for the following health issues:    HPI   Rash  Onset: 5-6 days ago    Description:   Location: right back and chest  Character: painful, red  Itching (Pruritis): burning    Progression of Symptoms:  worsening    Accompanying Signs & Symptoms:  Fever: no   Body aches or joint pain: no   Sore throat symptoms: no   Recent cold symptoms: no     History:   Previous similar rash: no     Precipitating factors:   Exposure to similar rash: no   New exposures: None   Recent travel: no     Alleviating factors:      Therapies Tried and outcome: cortisone at first    Reviewed and updated as needed this visit by provider:  Tobacco  Allergies  Meds  Problems  Med Hx  Surg Hx  Fam Hx         Review of Systems   Constitutional, HEENT, cardiovascular, pulmonary, GI, , musculoskeletal, neuro, skin, endocrine and psych systems are negative, except as otherwise noted.    This document serves as a record of the services and decisions personally performed and made by Kevan Marie MD. It was created on his behalf by Raudel Gregg, a trained medical scribe. The creation of this document is based the provider's statements to the medical scribe.  Scribe Raudel Gregg 4:59 PM, December 10, 2019    Objective   /80   Pulse 82   Temp 98.4  F (36.9  C) (Oral)   Ht 1.803 m (5' 11\")   Wt 88 kg (194 lb)   SpO2 96%   BMI 27.06 kg/m   Body mass index is 27.06 kg/m .  Physical Exam   GENERAL: healthy, alert, well nourished, well hydrated, no distress  EYES: Eyes grossly normal to inspection, extraocular movements - intact, and PERRL  MS: extremities- no gross deformities noted, no edema  SKIN: vesicular rash in the right sensory nerve from right mid torso to the sternum, otherwise, no suspicious lesions, no rashes  NEURO: strength and tone- normal, sensory exam- grossly normal, mentation- intact, speech- normal, reflexes- symmetric  PSYCH: Alert and " "oriented times 3; speech- coherent , normal rate and volume; able to articulate logical thoughts, able to abstract reason, no tangential thoughts, no hallucinations or delusions, affect- normal        Assessment & Plan   Reno was seen today for derm problem.    Diagnoses and all orders for this visit:    Herpes zoster without complication - Rash on right side. Burning lately. Start:  -     valACYclovir (VALTREX) 1000 mg tablet; Take 1 tablet (1,000 mg) by mouth 3 times daily for 7 days    BMI:   Estimated body mass index is 27.14 kg/m  as calculated from the following:    Height as of 8/22/19: 1.803 m (5' 11\").    Weight as of 8/22/19: 88.3 kg (194 lb 9.6 oz).   Weight management plan: Discussed healthy diet and exercise guidelines    See Patient Instructions    Return in about 1 week (around 12/17/2019) for recheck.    The information in this document, created by the medical scribe for me, accurately reflects the services I personally performed and the decisions made by me. I have reviewed and approved this document for accuracy prior to leaving the patient care area.  5:09 PM, 12/10/19        Sy Marie MD      [address] [address]  [email or web address]   Office: [phone]  Pager: [phone]         "

## 2020-10-30 ENCOUNTER — OFFICE VISIT (OUTPATIENT)
Dept: FAMILY MEDICINE | Facility: CLINIC | Age: 25
End: 2020-10-30
Payer: COMMERCIAL

## 2020-10-30 VITALS
BODY MASS INDEX: 27.16 KG/M2 | WEIGHT: 194 LBS | HEART RATE: 60 BPM | HEIGHT: 71 IN | OXYGEN SATURATION: 100 % | SYSTOLIC BLOOD PRESSURE: 120 MMHG | TEMPERATURE: 97.7 F | DIASTOLIC BLOOD PRESSURE: 60 MMHG

## 2020-10-30 DIAGNOSIS — B07.9 VIRAL WARTS, UNSPECIFIED TYPE: ICD-10-CM

## 2020-10-30 DIAGNOSIS — Z23 NEED FOR PROPHYLACTIC VACCINATION AND INOCULATION AGAINST INFLUENZA: Primary | ICD-10-CM

## 2020-10-30 PROCEDURE — 90471 IMMUNIZATION ADMIN: CPT | Performed by: NURSE PRACTITIONER

## 2020-10-30 PROCEDURE — 99213 OFFICE O/P EST LOW 20 MIN: CPT | Mod: 25 | Performed by: NURSE PRACTITIONER

## 2020-10-30 PROCEDURE — 90686 IIV4 VACC NO PRSV 0.5 ML IM: CPT | Performed by: NURSE PRACTITIONER

## 2020-10-30 ASSESSMENT — MIFFLIN-ST. JEOR: SCORE: 1877.11

## 2020-10-30 NOTE — PROGRESS NOTES
"Subjective   Reno Tran is a 25 year old male who presents to clinic today for the following health issues:    HPI   Warts  Onset/Duration: Back again x-6 months  Description (location/number): cluster - bottom of L foot  Accompanying signs and symptoms (pain, redness):  no   History: prior warts: Yes - same spot as frozen off last year  May and August 2019  Therapies tried and outcome: OTC meds - Dr Jose  Freeze - no relief      Reviewed and updated as needed this visit by provider:  Tobacco  Allergies  Meds  Problems  Med Hx  Surg Hx  Fam Hx          Review of Systems   Constitutional, HEENT, cardiovascular, pulmonary, GI, , musculoskeletal, neuro, skin, endocrine and psych systems are negative, except as otherwise noted in the HPI.          Objective   /60 (BP Location: Left arm, Patient Position: Chair, Cuff Size: Adult Large)   Pulse 60   Temp 97.7  F (36.5  C) (Tympanic)   Ht 1.803 m (5' 11\")   Wt 88 kg (194 lb)   SpO2 100%   BMI 27.06 kg/m   Body mass index is 27.06 kg/m .  Physical Exam   GENERAL: healthy, alert, well nourished, well hydrated, no distress  RESP: lungs clear to auscultation - no rales, no rhonchi, no wheezes  CV: regular rates and rhythm, normal S1 S2, no S3 or S4 and no murmur, no click or rub -  SKIN: no suspicious lesions, no rashes; 2-1/2 cm x 1.5 cm plantar wart distal medial surface of second left toe    Assessment & Plan   Reno was seen today for wart.    Diagnoses and all orders for this visit:    Need for prophylactic vaccination and inoculation against influenza  -     INFLUENZA VACCINE IM > 6 MONTHS VALENT IIV4 [28615]    Viral warts, unspecified type  Area in question has been present for a year and a half with some resolution with cryotherapy however it returned and is even larger.  This NP feels he needs a dermatology consultation for a more permanent solution to the area.   In the interest of cost to him and concern liquid nitrogen would likely be " "ineffective he did not complete that today.   Referral to dermatology near [place] where he lives.  Reno verbalizes understanding of plan of care and is in agreement.   -     DERMATOLOGY ADULT REFERRAL; Future    BMI:   Estimated body mass index is 27.06 kg/m  as calculated from the following:    Height as of this encounter: 1.803 m (5' 11\").    Weight as of this encounter: 88 kg (194 lb).     See Patient Instructions    Return if symptoms worsen or fail to improve.     Lisa Starks, FNP-BC     [address] [address]  [email or web address]   Office: [phone]      "

## 2020-10-30 NOTE — PATIENT INSTRUCTIONS
Patient Education     Treating Warts     You and your healthcare provider can discuss whether your warts need to be treated.   You and your healthcare provider can talk about what treatment may be best for your wart or warts. To get rid of your warts, you may need to try more than one type of treatment. The methods described below are often used to treat warts.   Types of treatment    Do nothing. Most warts will go away within 2 years, even without treatment. So doing nothing is sometimes a good option. This is particularly true for smaller warts that are not causing symptoms.    Cryotherapy (liquid nitrogen).  This kills skin cells by freezing them. It kills the warts and destroys skin infected by the wart-causing virus. This is done in your healthcare provider s office and will cause some mild pain. It may take several treatments over several weeks to get rid of the warts.    Topical medicines. Prescribed topical medicines can be put on the skin. These are often applied in the healthcare provider's office. But some prescriptions may be applied at home.    Over-the-counter (OTC) topical treatments.  OTC medicines that most often contain salicylic acid may be an option. These patches, liquids, and creams are used at home. The medicine is applied daily to the wart and nearby skin. It's often left on overnight. The dead skin is filed down the next day. In 1 to 3 days, the procedure can be repeated. Topical treatments are sometimes combined with cryotherapy.    Electrodessication and curettage (ED & C).   For this procedure, the healthcare provider puts numbing medicine on the wart. Then the wart is scraped or cut off. This type of treatment is often not the first line of therapy.    Laser surgery.  This can vaporize wart tissue or destroy the blood vessels that feed the wart. This is done in the provider's office.    Shots (injections). These can be used to treat warts that don t respond to other treatments, such as  stubborn or painful warts around the nails. This is done in the provider s office.    When to get medical treatment  It s a good idea to have your healthcare provider check your warts. That way your provider can rule out any other skin problems. Sometimes a callous or a corn can look like a wart. But the treatments may differ. Treatment can also provide relief from warts that bleed, burn, hurt, or itch. Genital warts should always be treated. They can spread to other people through sexual contact. And they may cause genital or cervical cancer.   After having your warts treated, new warts may still appear. Don t be discouraged. Warts often come back. See your healthcare provider again to talk about this. Your provider can tell you about the treatments that most likely will help clear your skin of warts.   [employer] last reviewed this educational content on 7/1/2019 2000-2020 [employer]. [address] [place] [address]. All rights reserved. This information is not intended as a substitute for professional medical care. Always follow your healthcare professional's instructions.           Patient Education     What Are Warts?    Warts are common skin growths that can appear anywhere on the body. There are many types of warts. In most cases, they are benign (not cancer) and harmless. Warts can be embarrassing or sometimes painful. The good news is that they can be treated.   Who gets warts?  Warts are most common in children and teens. But they can occur at any age. They are also more common in certain jobs, such as those that involve handling meat, poultry, or fish. A weak immune system may make you more likely to have warts.   What causes warts?  Warts are caused by the human papillomavirus (HPV). There are over 150 types of HPV. This virus can spread between people. You can be exposed to the virus and not get warts. Warts tend to form where skin is damaged or broken. But they can also appear  elsewhere. Left untreated, warts can grow in number. They can also spread to other parts of the body.   Types of warts  There are many types of warts. Some of the most common ones are described below:    Common warts. These have a raised, rough surface. Enlarged blood vessels in the warts look like dots on the warts  surface. Common warts form mainly on the hands, but can appear on other parts of the body.    Plantar warts. These are warts on the soles of the feet. When you stand or walk, pressure makes plantar warts painful. When they form in clusters, plantar warts are called mosaic warts.    Periungual warts. These form under and around fingernails. People who bite their nails are more at risk.    Filiform warts. These are slender, fingerlike growths that can dangle from the skin. They most often appear on the face and neck.    Flat warts. These are small, smooth growths. They tend to form in clusters on the face, backs of the hands, or legs.    Genital warts. These can appear on or around the genitals. These warts can spread and are linked to cervical, anal, and other cancers. So it is important to have them treated quickly and to discuss these with sexual partners.  [employer] last reviewed this educational content on 8/1/2019 2000-2020 [employer]. [address] [place] [address], [place] [address]. All rights reserved. This information is not intended as a substitute for professional medical care. Always follow your healthcare professional's instructions.

## 2020-12-09 NOTE — TELEPHONE ENCOUNTER
FUTURE VISIT INFORMATION      FUTURE VISIT INFORMATION:    Date: 12.10.20    Time: 9:20 AM    Location: [facility]  REFERRAL INFORMATION:    Referring provider:  Lisa Starks    Referring providers clinic:  [facility]    Reason for visit/diagnosis: wart on toe    RECORDS REQUESTED FROM:       Clinic name Comments Records Status Imaging Status   [facility] 8.20.19 Dr. Stark  10.30.20 Dr. Starks EPIC

## 2020-12-10 ENCOUNTER — VIRTUAL VISIT (OUTPATIENT)
Dept: DERMATOLOGY | Facility: CLINIC | Age: 25
End: 2020-12-10
Attending: NURSE PRACTITIONER
Payer: COMMERCIAL

## 2020-12-10 ENCOUNTER — PRE VISIT (OUTPATIENT)
Dept: DERMATOLOGY | Facility: CLINIC | Age: 25
End: 2020-12-10

## 2020-12-10 DIAGNOSIS — B07.9 VIRAL WARTS, UNSPECIFIED TYPE: Primary | ICD-10-CM

## 2020-12-10 PROCEDURE — 99202 OFFICE O/P NEW SF 15 MIN: CPT | Mod: 95 | Performed by: STUDENT IN AN ORGANIZED HEALTH CARE EDUCATION/TRAINING PROGRAM

## 2020-12-10 ASSESSMENT — PAIN SCALES - GENERAL: PAINLEVEL: NO PAIN (0)

## 2020-12-10 NOTE — PROGRESS NOTES
I talked with and examined Reno Tran in today's videovisit and I agree with the assessment and the plan. LAWRENCE Flower MD.

## 2020-12-10 NOTE — NURSING NOTE
Dermatology Rooming Note    Reno Tran's goals for this visit include:   Chief Complaint   Patient presents with     Derm Problem     Viral warts - Reno would like to discuss a wart on his left foot toe     Geni Smart, CMA

## 2020-12-10 NOTE — LETTER
12/10/2020       RE: Reno Tran  [address]  [address]  [facility] [address]     Dear Colleague,    Thank you for referring your patient, Reno Tran, to the [facility] [place] at [facility]. Please see a copy of my visit note below.    [facility] Dermatology Record:  [facility]cted    Dermatology Problem List:  1. Wart, left 2nd toe  - Current Tx: altering 40% sal acid plaster and urea 40% cream  - Previous Tx: cryotherapy (OTC)    Encounter Date: Dec 10, 2020    CC:   Chief Complaint   Patient presents with     Derm Problem     Viral warts - Reno would like to discuss a wart on his left foot toe       History of Present Illness:  Reno Tran is a 25 year old male who presents for for further evaluation of a spot on his left second toe. Reports that he had a wart there lat year which was treated with LN2. Thought it responded, but the wart persisted and recurred. Went to NP provider who recommended referral to Dermatology for further treatment. Denies any other warts. Denies wearing shoes without socks and walkign around barefoot. Denies using any file on the wart. Is a runner    ROS: Patient is generally feeling well today    Physical Examination:  General: Well-appearing, appropriately-developed individual.  Skin: Focused examination including foot was performed.   - verrucous appearing papule on the medial second left toe with thrombosed capillaries            Past Medical History:   Patient Active Problem List   Diagnosis     Seasonal allergies     Past Medical History:   Diagnosis Date     Contact dermatitis and other eczema, due to unspecified cause      Seasonal allergies     spring, summer, fall     Past Surgical History:   Procedure Laterality Date     ENDOSCOPIC BALLOON SINUPLASTY ACCLARENT N/A 1/15/2015    Procedure: ENDOSCOPIC BALLOON SINUPLASTY ACCLARENT;  Surgeon: Neal Garcia MD;  Location: [facility]     SEPTOPLASTY, ENDOSCOPIC  SINUS SURGERY, COMBINED N/A 1/15/2015    Procedure: COMBINED SEPTOPLASTY, ENDOSCOPIC SINUS SURGERY;  Surgeon: Neal Garcia MD;  Location: [facility]     SURGICAL HISTORY OF -   age 1    T & A       Social History:  Patient reports that he has never smoked. He has never used smokeless tobacco. He reports current alcohol use. He reports that he does not use drugs.    Family History:  Family History   Problem Relation Age of Onset     Respiratory Maternal Aunt      Respiratory Other         cousin     Heart Disease Other         great grandpa       Medications:  Current Outpatient Medications   Medication     mometasone (NASONEX) 50 MCG/ACT spray     No current facility-administered medications for this visit.      No Known Allergies    Impression and Recommendations (Patient Counseled on the Following):  1. Verruca  - Based off history and clinically provided photos, condition on the foot most consistent with a verruca. Discussed the possible etiologies, clinical course, and management options of this benign condition with the patient. After further discussion, will start with alternating 40% salicylic acid and 40% urea cream every other night for 6 weeks  - Prescribed 40% salicylic acid  - Recommended to obtain 40% urea off [employer]   - Recommended to alternate the two treatments at bedtime followed by duct tape or an occlusive dressing  - Discussed that combination therapy of treatments increases efficacy overall, but that warts do respond with time. Discussed other treatment options including cryotherapy, intralesional candida, and 5FU cream    Follow-up:   Follow-up with dermatology in approximately 6 weeks (in person for possible IL/cryo) . Earlier for new or changing lesions or rash.      Staff and resident    Patient was seen and staffed with attending physician, Dr. Flower, who was present the entire televideo visit    Prosper Clement MD  Med/Derm PGY-3  P: [phone]    Staff attestation:    Please see  additional note for staff attestation  ____________________________________________________________________________    Teledermatology information:  - Location of patient: [place]  - Patient presented as: return  - Location of teledermatologist:  [facility] [place] )  - Image quality and interpretability: acceptable  - Physician has received verbal consent for a Video/Photos Visit from the patient? YES  - In-person dermatology visit recommendation: yes - for physician visit (in 6 weeks)  - Date of images: 12/7/20  - Service start time: 0930  - Service end time: 0945  - Date of report: 12/10/20     I talked with and examined Reno Tran in today's videovisit and I agree with the assessment and the plan. LAWRENCE Flower MD.

## 2020-12-10 NOTE — PROGRESS NOTES
[facility] Dermatology Record:  [facility]ychart Connected    Dermatology Problem List:  1. Wart, left 2nd toe  - Current Tx: altering 40% sal acid plaster and urea 40% cream  - Previous Tx: cryotherapy (OTC)    Encounter Date: Dec 10, 2020    CC:   Chief Complaint   Patient presents with     Derm Problem     Viral warts - Reno would like to discuss a wart on his left foot toe       History of Present Illness:  Reno Tran is a 25 year old male who presents for for further evaluation of a spot on his left second toe. Reports that he had a wart there lat year which was treated with LN2. Thought it responded, but the wart persisted and recurred. Went to NP provider who recommended referral to Dermatology for further treatment. Denies any other warts. Denies wearing shoes without socks and walkign around barefoot. Denies using any file on the wart. Is a runner    ROS: Patient is generally feeling well today    Physical Examination:  General: Well-appearing, appropriately-developed individual.  Skin: Focused examination including foot was performed.   - verrucous appearing papule on the medial second left toe with thrombosed capillaries            Past Medical History:   Patient Active Problem List   Diagnosis     Seasonal allergies     Past Medical History:   Diagnosis Date     Contact dermatitis and other eczema, due to unspecified cause      Seasonal allergies     spring, summer, fall     Past Surgical History:   Procedure Laterality Date     ENDOSCOPIC BALLOON SINUPLASTY ACCLARENT N/A 1/15/2015    Procedure: ENDOSCOPIC BALLOON SINUPLASTY ACCLARENT;  Surgeon: Neal Garcia MD;  Location: [facility]     SEPTOPLASTY, ENDOSCOPIC SINUS SURGERY, COMBINED N/A 1/15/2015    Procedure: COMBINED SEPTOPLASTY, ENDOSCOPIC SINUS SURGERY;  Surgeon: Neal Garcia MD;  Location: [facility]     SURGICAL HISTORY OF -   age 1    T & A       Social History:  Patient reports that he has never smoked. He has never used smokeless tobacco. He reports  current alcohol use. He reports that he does not use drugs.    Family History:  Family History   Problem Relation Age of Onset     Respiratory Maternal Aunt      Respiratory Other         cousin     Heart Disease Other         great grandpa       Medications:  Current Outpatient Medications   Medication     mometasone (NASONEX) 50 MCG/ACT spray     No current facility-administered medications for this visit.      No Known Allergies    Impression and Recommendations (Patient Counseled on the Following):  1. Verruca  - Based off history and clinically provided photos, condition on the foot most consistent with a verruca. Discussed the possible etiologies, clinical course, and management options of this benign condition with the patient. After further discussion, will start with alternating 40% salicylic acid and 40% urea cream every other night for 6 weeks  - Prescribed 40% salicylic acid  - Recommended to obtain 40% urea off [employer]   - Recommended to alternate the two treatments at bedtime followed by duct tape or an occlusive dressing  - Discussed that combination therapy of treatments increases efficacy overall, but that warts do respond with time. Discussed other treatment options including cryotherapy, intralesional candida, and 5FU cream    Follow-up:   Follow-up with dermatology in approximately 6 weeks (in person for possible IL/cryo) . Earlier for new or changing lesions or rash.      Staff and resident    Patient was seen and staffed with attending physician, Dr. Flower, who was present the entire televideo visit    Prosper Clement MD  Med/Derm PGY-3  P: [phone]    Staff attestation:    Please see additional note for staff attestation  ____________________________________________________________________________    Teledermatology information:  - Location of patient: [place]  - Patient presented as: return  - Location of teledermatologist:  ([facility] [place] )  - Image  quality and interpretability: acceptable  - Physician has received verbal consent for a Video/Photos Visit from the patient? YES  - In-person dermatology visit recommendation: yes - for physician visit (in 6 weeks)  - Date of images: 12/7/20  - Service start time: 0930  - Service end time: 0945  - Date of report: 12/10/20

## 2020-12-10 NOTE — PATIENT INSTRUCTIONS
[facility] Dermatology Visit    Thank you for allowing us to participate in your care. Your findings, instructions and follow-up plan are as follows:    1. Wart   - We agree that the spot on the left second toe is a wart  - Until follow up, please try the following regimen   - alternate using 40% salicylic acid (mediplast) and urea 40% (can get on amazon)   - apply to wart at bedtime and then use duct tape or an additional occlusive dressing    - leave on overnight and wash off in the morning   - can leave uncovered during the day  - in addition, if you use anything to remove the skin around the wart, please do not use that tool on any other skin (prevents the spread of the wart!)  - we'll see you ideally in person in 6 weeks for possible injection and repeat freezing    When should I call my doctor?    If you are worsening or not improving, please, contact us or seek urgent care as noted below.     Who should I call with questions (adults)?    [facility] (adult and pediatric): [phone]     [facility] (adult): [phone]    For urgent needs outside of business hours call the [facility] at [phone] and ask for the dermatology resident on call    If this is a medical emergency and you are unable to reach an ER, Call [phone]      Who should I call with questions (pediatric)?  [facility]- Pediatric Dermatology  Dr. Kitty Beavers, Dr. Segundo Raza, Dr. Penelope Lai, Ivone Maldonado, [place]  Dr. Ml Sanders, Dr. Andree Fisher & Dr. Antoni Allen  Non Urgent  Nurse Triage Line; [phone]- Winifred and Alexandra MARRERO Care Coordinators   [place] (/Complex ) [phone]    If you need a prescription refill, please contact your pharmacy. Refills are approved or denied by our Physicians during normal business hours, Monday through Fridays  Per office policy, refills will not be  granted if you have not been seen within the past year (or sooner depending on your child's condition)    Scheduling Information:  Pediatric Appointment Scheduling and Call Center [phone]  Radiology Scheduling- [phone]  Sedation Unit Scheduling- [phone]  [place] Scheduling- General [phone]; Pediatric Dermatology [phone]  Main  Services: [phone]  Cypriot: [phone]  Kazakh: [phone]  Hmong/Japanese/Belarusian: [phone]  Preadmission Nursing Department Fax Number: [phone] (Fax all pre-operative paperwork to this number)    For urgent matters arising during evenings, weekends, or holidays that cannot wait for normal business hours please call [phone] and ask for the Dermatology Resident On-Call to be paged.

## 2020-12-17 ENCOUNTER — TELEPHONE (OUTPATIENT)
Dept: DERMATOLOGY | Facility: CLINIC | Age: 25
End: 2020-12-17

## 2020-12-17 NOTE — TELEPHONE ENCOUNTER
[facility] Call Center    Phone Message    May a detailed message be left on voicemail: yes     Reason for Call: Medication Question or concern regarding medication   Prescription Clarification  Name of Medication: salicylic acid (MEDIPLAST) 40 % miscellaneous  Prescribing Provider: Dr. Flower   Pharmacy:  [facility]   [address] [address]   What on the order needs clarification? Pt called and said that the [facility] on file no longer carries this medication. Pt was wondering if the provider would know which pharm would carry it.  If the provider doesn't know then pt would like to try the [facility] above. Please call the pt back to confirm.          Action Taken: Message routed to:  [facility] ([facility]): DERM    Travel Screening: Not Applicable

## 2020-12-17 NOTE — TELEPHONE ENCOUNTER
[employer] message sent advising patient that this product is available over the counter.    Nanette Schilling MD (PGY-3)  Dermatology Resident on Call

## 2020-12-31 NOTE — TELEPHONE ENCOUNTER
As above, Mediplast (40% salicylic acid) is available through [employer] or likely at some local pharmacies but he would have to call those pharmacies to see if they have any in stock and [employer] is easier.

## 2021-01-15 ENCOUNTER — HEALTH MAINTENANCE LETTER (OUTPATIENT)
Age: 26
End: 2021-01-15

## 2021-09-04 ENCOUNTER — HEALTH MAINTENANCE LETTER (OUTPATIENT)
Age: 26
End: 2021-09-04

## 2022-02-19 ENCOUNTER — HEALTH MAINTENANCE LETTER (OUTPATIENT)
Age: 27
End: 2022-02-19

## 2022-10-16 ENCOUNTER — HEALTH MAINTENANCE LETTER (OUTPATIENT)
Age: 27
End: 2022-10-16

## 2023-04-01 ENCOUNTER — HEALTH MAINTENANCE LETTER (OUTPATIENT)
Age: 28
End: 2023-04-01

## 2023-05-05 ENCOUNTER — OFFICE VISIT (OUTPATIENT)
Dept: FAMILY MEDICINE | Facility: CLINIC | Age: 28
End: 2023-05-05
Payer: COMMERCIAL

## 2023-05-05 VITALS
BODY MASS INDEX: 27.2 KG/M2 | TEMPERATURE: 98.7 F | RESPIRATION RATE: 12 BRPM | HEIGHT: 70 IN | SYSTOLIC BLOOD PRESSURE: 131 MMHG | HEART RATE: 62 BPM | WEIGHT: 190 LBS | OXYGEN SATURATION: 97 % | DIASTOLIC BLOOD PRESSURE: 81 MMHG

## 2023-05-05 DIAGNOSIS — Z00.00 ANNUAL PHYSICAL EXAM: Primary | ICD-10-CM

## 2023-05-05 DIAGNOSIS — D22.9 BENIGN MOLE: ICD-10-CM

## 2023-05-05 PROCEDURE — 99395 PREV VISIT EST AGE 18-39: CPT | Mod: 25 | Performed by: FAMILY MEDICINE

## 2023-05-05 PROCEDURE — 0124A COVID-19 BIVALENT 12+ (PFIZER): CPT | Performed by: FAMILY MEDICINE

## 2023-05-05 PROCEDURE — 91312 COVID-19 BIVALENT 12+ (PFIZER): CPT | Performed by: FAMILY MEDICINE

## 2023-05-05 ASSESSMENT — ENCOUNTER SYMPTOMS
COUGH: 0
DYSURIA: 0
WEAKNESS: 0
EYE PAIN: 0
PALPITATIONS: 0
HEMATOCHEZIA: 0
CHILLS: 0
FREQUENCY: 0
DIARRHEA: 0
FEVER: 0
MYALGIAS: 0
PARESTHESIAS: 0
SHORTNESS OF BREATH: 0
ABDOMINAL PAIN: 0
HEMATURIA: 0
NERVOUS/ANXIOUS: 0
DIZZINESS: 0
CONSTIPATION: 0
JOINT SWELLING: 0
ARTHRALGIAS: 0
HEADACHES: 0
SORE THROAT: 0
NAUSEA: 0
HEARTBURN: 0

## 2023-05-05 ASSESSMENT — PAIN SCALES - GENERAL: PAINLEVEL: NO PAIN (0)

## 2023-05-05 NOTE — PROGRESS NOTES
"Assessment/Plan    Preventive.  See below orders for screening tests and immunizations.  -denies STI concerns  -I recommended HPV vaccine. [place] will check insurance coverage  -encouraged avoiding consumption of more than 4 EtOH drinks in a single day    Mole, right upper abdomen. Appears benign on exam.  -return if enlarging    F/u annually.    Orders Placed This Encounter   Procedures     REVIEW OF HEALTH MAINTENANCE PROTOCOL ORDERS     COVID-19 BIVALENT 12+ (PFIZER)     ----  Chief complaint: Physical, Mole, and Imm/Inj (Asking about take home covid tests)    Social History     Social History Narrative    Updated 5/6/2023: Grew up in [place]. Lives with wife (ICU RN, NP student). Has cat. Works in marketing analytics. Enjoys running.     Patient is concerned about a mole on the right side of his abdomen.  He thinks that it has been present for his entire life.  The mole now seems to be raised, but it has otherwise been stable in size.  Recent bleeding from the site after patient scratched it.  Patient denies a personal or family history of skin cancer.    Uses cannabis a few times per week on average.  Denies concerns regarding use.    Exam  /81   Pulse 62   Temp 98.7  F (37.1  C) (Temporal)   Resp 12   Ht 1.784 m (5' 10.24\")   Wt 86.2 kg (190 lb)   SpO2 97%   BMI 27.08 kg/m       Oropharynx without abnormal masses.  Lung fields clear to auscultation bilaterally.  Heart with regular rate and rhythm, no murmurs.    Dark pink lesion on right side of upper abdomen.  6 mm in diameter.  Mildly raised.  Relatively uniform in color, with just a few specks of dark brown.  Borders are round.  "

## 2024-08-10 ENCOUNTER — HEALTH MAINTENANCE LETTER (OUTPATIENT)
Age: 29
End: 2024-08-10

## 2025-08-16 ENCOUNTER — HEALTH MAINTENANCE LETTER (OUTPATIENT)
Age: 30
End: 2025-08-16